# Patient Record
Sex: FEMALE | Race: WHITE
[De-identification: names, ages, dates, MRNs, and addresses within clinical notes are randomized per-mention and may not be internally consistent; named-entity substitution may affect disease eponyms.]

---

## 2017-02-22 ENCOUNTER — HOSPITAL ENCOUNTER (EMERGENCY)
Dept: HOSPITAL 57 - BURERS | Age: 57
Discharge: HOME | End: 2017-02-22
Payer: COMMERCIAL

## 2017-02-22 DIAGNOSIS — S52.502A: Primary | ICD-10-CM
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
## 2017-02-22 PROCEDURE — 96372 THER/PROPH/DIAG INJ SC/IM: CPT

## 2017-02-22 PROCEDURE — 25605 CLTX DST RDL FX/EPHYS SEP W/: CPT

## 2017-02-22 NOTE — RAD
LEFT WRIST TWO VIEWS

2/22/17

 

Post reduction films show restoration of a more normal alignment of the distal radial fracture. Ther
e is now no significant angulation. A splint has been applied. 

 

IMPRESSION:  

Successful reduction of the distal radial fracture.

 

POS: HOME

## 2017-02-22 NOTE — RAD
LEFT WRIST THREE VIEWS

2/22/17

 

A comminuted impacted fracture of the distal radius is present with extension into the radiocarpal j
oint. I cannot definitely detect a fracture of the ulnar styloid. The carpals appear intact. There i
s dorsal angulation of the distal radial fragment. 

 

IMPRESSION:  

Colles' fracture of the distal radius.

 

POS: HOME

## 2019-10-14 ENCOUNTER — HOSPITAL ENCOUNTER (OUTPATIENT)
Dept: HOSPITAL 92 - BICMRI | Age: 59
Discharge: HOME | End: 2019-10-14
Attending: FAMILY MEDICINE
Payer: COMMERCIAL

## 2019-10-14 DIAGNOSIS — G44.1: Primary | ICD-10-CM

## 2019-10-14 LAB — ESTIMATED GFR-MDRD - POC: 57

## 2019-10-14 PROCEDURE — 70553 MRI BRAIN STEM W/O & W/DYE: CPT

## 2019-10-14 PROCEDURE — 82565 ASSAY OF CREATININE: CPT

## 2019-10-14 NOTE — MRI
Exam: Brain MRI with and without contrast



HISTORY: Headache



COMPARISON: None



FINDINGS:

Gradient echo sequence: No hemorrhage

Calvarium: Appropriate T1 marrow signal intensity

Midline brain parenchyma: Unremarkable

Cerebrum:No parenchymal mass, mass effect or midline shift. Brain volume is age-appropriate. Cortical
 gray-white matter differentiation is preserved. Minimal scattered T2 and FLAIR white matter

hyperintensities, likely representing chronic small vessel ischemic change.

Ventricles: No evidence of hydrocephalus.



Sinuses and mastoid air cells: Adequate aeration

Diffusion: Central arterial flow is maintained. Absent restricted diffusion.

Postcontrast images: No pathologic enhancement of the brain parenchyma.



IMPRESSION:



1. Unremarkable pre and postcontrast brain MRI.

2. Minimal scattered T2 and FLAIR white matter hyperintensities, likely due to chronic small vessel i
schemic change.

3. Absent restricted diffusion. No pathologic enhancement.



Reported By: Earnestine Quintana 

Electronically Signed:  10/14/2019 10:08 AM

## 2021-01-09 ENCOUNTER — HOSPITAL ENCOUNTER (INPATIENT)
Dept: HOSPITAL 92 - ERS | Age: 61
LOS: 3 days | Discharge: HOME | DRG: 340 | End: 2021-01-12
Attending: SURGERY | Admitting: SURGERY
Payer: COMMERCIAL

## 2021-01-09 ENCOUNTER — HOSPITAL ENCOUNTER (EMERGENCY)
Dept: HOSPITAL 57 - BURERS | Age: 61
Discharge: ANOTHER HOSPITAL | End: 2021-01-09
Payer: COMMERCIAL

## 2021-01-09 VITALS — BODY MASS INDEX: 31.2 KG/M2

## 2021-01-09 DIAGNOSIS — Z88.8: ICD-10-CM

## 2021-01-09 DIAGNOSIS — Z79.899: ICD-10-CM

## 2021-01-09 DIAGNOSIS — K35.33: Primary | ICD-10-CM

## 2021-01-09 DIAGNOSIS — I10: ICD-10-CM

## 2021-01-09 DIAGNOSIS — G43.909: ICD-10-CM

## 2021-01-09 DIAGNOSIS — G89.29: ICD-10-CM

## 2021-01-09 DIAGNOSIS — Z98.1: ICD-10-CM

## 2021-01-09 DIAGNOSIS — Z88.1: ICD-10-CM

## 2021-01-09 DIAGNOSIS — Z88.2: ICD-10-CM

## 2021-01-09 DIAGNOSIS — M41.9: ICD-10-CM

## 2021-01-09 DIAGNOSIS — I34.1: ICD-10-CM

## 2021-01-09 DIAGNOSIS — Z20.822: ICD-10-CM

## 2021-01-09 LAB
ALBUMIN SERPL BCG-MCNC: 4 G/DL (ref 3.4–4.8)
ALP SERPL-CCNC: 70 U/L (ref 40–110)
ALT SERPL W P-5'-P-CCNC: 31 U/L (ref 8–55)
AMYLASE SERPL-CCNC: 75 U/L (ref 25–125)
ANION GAP SERPL CALC-SCNC: 17 MMOL/L (ref 10–20)
APTT PPP: 27.8 SEC (ref 22.9–36.1)
AST SERPL-CCNC: 22 U/L (ref 5–34)
BASOPHILS # BLD AUTO: 0 THOU/UL (ref 0–0.2)
BASOPHILS NFR BLD AUTO: 0.9 % (ref 0–1)
BILIRUB SERPL-MCNC: 0.7 MG/DL (ref 0.2–1.2)
BUN SERPL-MCNC: 11 MG/DL (ref 9.8–20.1)
CALCIUM SERPL-MCNC: 8.6 MG/DL (ref 7.8–10.44)
CHLORIDE SERPL-SCNC: 100 MMOL/L (ref 98–107)
CO2 SERPL-SCNC: 23 MMOL/L (ref 23–31)
CREAT CL PREDICTED SERPL C-G-VRATE: 0 ML/MIN (ref 70–130)
EOSINOPHIL # BLD AUTO: 0 THOU/UL (ref 0–0.7)
EOSINOPHIL NFR BLD AUTO: 0 % (ref 0–10)
GLOBULIN SER CALC-MCNC: 2.6 G/DL (ref 2.4–3.5)
GLUCOSE SERPL-MCNC: 175 MG/DL (ref 80–115)
HGB BLD-MCNC: 15.1 G/DL (ref 12–16)
INR PPP: 1.1
LYMPHOCYTES # BLD AUTO: 0.5 THOU/UL (ref 1.2–3.4)
LYMPHOCYTES NFR BLD AUTO: 13.7 % (ref 21–51)
MCH RBC QN AUTO: 31.1 PG (ref 27–31)
MCV RBC AUTO: 95.2 FL (ref 78–98)
MONOCYTES # BLD AUTO: 0.1 THOU/UL (ref 0.11–0.59)
MONOCYTES NFR BLD AUTO: 4.2 % (ref 0–10)
NEUTROPHILS # BLD AUTO: 2.8 THOU/UL (ref 1.4–6.5)
NEUTROPHILS NFR BLD AUTO: 81.1 % (ref 42–75)
PLATELET # BLD AUTO: 152 THOU/UL (ref 130–400)
POTASSIUM SERPL-SCNC: 4.1 MMOL/L (ref 3.5–5.1)
PROTHROMBIN TIME: 14.5 SEC (ref 12–14.7)
RBC # BLD AUTO: 4.87 MILL/UL (ref 4.2–5.4)
SODIUM SERPL-SCNC: 136 MMOL/L (ref 136–145)
SP GR UR STRIP: 1.01 (ref 1–1.03)
WBC # BLD AUTO: 3.4 THOU/UL (ref 4.8–10.8)

## 2021-01-09 PROCEDURE — 80048 BASIC METABOLIC PNL TOTAL CA: CPT

## 2021-01-09 PROCEDURE — 83690 ASSAY OF LIPASE: CPT

## 2021-01-09 PROCEDURE — S0020 INJECTION, BUPIVICAINE HYDRO: HCPCS

## 2021-01-09 PROCEDURE — 93005 ELECTROCARDIOGRAM TRACING: CPT

## 2021-01-09 PROCEDURE — 0240U: CPT

## 2021-01-09 PROCEDURE — 81003 URINALYSIS AUTO W/O SCOPE: CPT

## 2021-01-09 PROCEDURE — 82150 ASSAY OF AMYLASE: CPT

## 2021-01-09 PROCEDURE — 74177 CT ABD & PELVIS W/CONTRAST: CPT

## 2021-01-09 PROCEDURE — 85025 COMPLETE CBC W/AUTO DIFF WBC: CPT

## 2021-01-09 PROCEDURE — 85610 PROTHROMBIN TIME: CPT

## 2021-01-09 PROCEDURE — 0DTJ4ZZ RESECTION OF APPENDIX, PERCUTANEOUS ENDOSCOPIC APPROACH: ICD-10-PCS | Performed by: SURGERY

## 2021-01-09 PROCEDURE — 86901 BLOOD TYPING SEROLOGIC RH(D): CPT

## 2021-01-09 PROCEDURE — 71045 X-RAY EXAM CHEST 1 VIEW: CPT

## 2021-01-09 PROCEDURE — 86850 RBC ANTIBODY SCREEN: CPT

## 2021-01-09 PROCEDURE — 86900 BLOOD TYPING SEROLOGIC ABO: CPT

## 2021-01-09 PROCEDURE — S0028 INJECTION, FAMOTIDINE, 20 MG: HCPCS

## 2021-01-09 PROCEDURE — 96374 THER/PROPH/DIAG INJ IV PUSH: CPT

## 2021-01-09 PROCEDURE — 83735 ASSAY OF MAGNESIUM: CPT

## 2021-01-09 PROCEDURE — 88304 TISSUE EXAM BY PATHOLOGIST: CPT

## 2021-01-09 PROCEDURE — 0W9J4ZZ DRAINAGE OF PELVIC CAVITY, PERCUTANEOUS ENDOSCOPIC APPROACH: ICD-10-PCS | Performed by: SURGERY

## 2021-01-09 PROCEDURE — 36415 COLL VENOUS BLD VENIPUNCTURE: CPT

## 2021-01-09 PROCEDURE — 85730 THROMBOPLASTIN TIME PARTIAL: CPT

## 2021-01-09 PROCEDURE — 80053 COMPREHEN METABOLIC PANEL: CPT

## 2021-01-09 PROCEDURE — 84100 ASSAY OF PHOSPHORUS: CPT

## 2021-01-09 RX ADMIN — METRONIDAZOLE SCH MLS: 500 INJECTION, SOLUTION INTRAVENOUS at 23:24

## 2021-01-09 RX ADMIN — FAMOTIDINE SCH: 10 INJECTION, SOLUTION INTRAVENOUS at 20:35

## 2021-01-09 NOTE — CT
CT ABDOMEN AND PELVIS WITH CONTRAST:

 

Date:  01/09/2021

 

Spiral CT of the abdomen and pelvis was done for evaluation of abdominal pain. 

 

The major finding on this study is a markedly dilated appendix, up to 2.0 cm in width. Multiple appen
dicoliths are seen within it, one large one being at the base as it joins the cecum. The appendiceal 
walls enhance with IV contrast. There is periappendiceal stranding and some free fluid in the vicinit
y. All of these findings suggest acute appendicitis, presumably with rupture. Some pockets of free fl
uid collect down in the cul-de-sac, as well as around the region of the appendix itself. The appendix
 is rather long and descends rather deeply down into the pelvis and is nearly at one side of the cul-
de-sac. No free air is seen. 

 

The lung bases are clear, except for some atelectasis. The liver, spleen, pancreas, gallbladder, adre
nal glands, and abdominal aorta showed no acute findings. There is virtually no arteriosclerotic mendiola
ge. The right kidney appears normal. The left kidney contains a 1.6 cm rounded lucency that is most l
ikely a cyst. Its CT numbers are slightly high at 16. An elective ultrasound to prove that it is a cy
st would be prudent at some point in the future. 

 

Aside from the appendiceal findings, the only other GI tract finding of note is a small hiatal hernia
. 

 

CT of the pelvis was remarkable for the appendiceal, inflammatory, and fluid findings. No other areas
 of concern were seen. Extensive degenerative changes are present in the patient's spine and there ha
ve been prior laminectomies in the lower lumbar region. 

 

IMPRESSION: 

Findings consistent with acute appendicitis, presumably with rupture, given free fluid seen in the vi
cinity. See full description above. 

 

Findings discussed with Dr. Fajardo at 0816 hours on 01/09/2021. 

CODE CR. 

 

 

POS: HOME

## 2021-01-09 NOTE — OP
DATE OF PROCEDURE:  01/09/2021



PREOPERATIVE DIAGNOSIS:  Acute appendicitis with perforation.



POSTOPERATIVE DIAGNOSES:  

1. Acute appendicitis with perforation.

2. Peritoneal abscesses.



OPERATIONS PERFORMED:  

1. Laparoscopic appendectomy.

2. Drainage of peritoneal abscesses.



ANESTHESIA:  General endotracheal.



ESTIMATED BLOOD LOSS:  20 mL.



FLUIDS GIVEN:  1000 mL crystalloids.



URINARY OUTPUT:  600 mL.



COUNTS:  Sponge and instrument counts were verified as correct x2.



COMPLICATIONS:  None apparent at the time of operation.



INDICATIONS FOR OPERATION:  A 60-year-old woman, presented with 2-day history of

what started as periumbilical abdominal pain, which become generalized by today. 



Clinical and radiographic examination were consistent with acute appendicitis with

perforation for which the patient was brought to the operating room for

appendectomy. 



Findings are consistent with suppurative, perforated acute appendicitis with

peritoneal abscesses. 



DESCRIPTION OF OPERATION:  Informed consent was obtained from the patient, was

brought to the operating room and placed in supine position. 



Following general anesthesia, Khoury catheter was inserted and placed to bedside

drain. 



The abdomen was sterilely prepped and draped in usual fashion. 



Skin below the umbilicus was infiltrated with 0.25% Marcaine with epinephrine. 



A small curvilinear infraumbilical incision was made using an 11 scalpel. 



Umbilical stalk was grasped with Kocher and elevated. 



Veress needle was inserted through the incision and placed in the peritoneal cavity,

through which the abdomen was insufflated with 3 L of CO2 gas. 



Intraabdominal pressure noted at 1 mmHg. 



Following abdominal insufflation, the Veress needle was removed and a 5 mm trocar

introduced using a Visiport under laparoscopy. 



Laparoscopy confirmed proper placement of the port.  No injuries to underlying

structures. 



Additional laparoscopy reveals extensive amount of intraperitoneal fibrinous

exudates with cloudy ascitic fluid. 



Significant omental adhesions obscured the right lower quadrant. 



Under direct laparoscopy, a 5 mm left lower quadrant port was placed after the

overlying skin was infiltrated with 0.25% Marcaine with epinephrine.  Appropriate

incision was made. 



I then introduced the Endo suction catheter with cautery, using this to bluntly take

down omental adhesions off the anterior abdominal wall, exposing a large amount of

purulent fluid in the deep pelvis which was suctioned out. 



At that juncture, a 5 mm suprapubic port was placed under direct vision after the

overlying skin was infiltrated with 0.25% Marcaine with epinephrine. 



The patient was then placed in a Trendelenburg position and rotated to her left. 



I introduced a Prestige grasper through the left lower quadrant port site, using

this to bluntly tease apart small bowel and omental adhesions.  I was then able to

trace the distal ileum to the appendix. 



I did not find any Meckel diverticulum. 



An intrapelvic suppurative appendix was identified, which was perforated at the

base. 



I then introduced the Endo [place] forceps through the suprapubic port site grasping

the appendix, which was elevated. 



Mesoappendix was then serially divided down to the base using a LigaSure device with

good hemostasis. 



The appendix itself was divided at the appendicocecal junction between Endoloop and

then delivered off the abdominal cavity using an EndoCatch. 



Operative site was copiously irrigated with saline until it was clear. 



A #19 Dani drain was introduced into the pelvic cavity and allowing this to exit

through the left lower quadrant port site. 



The catheter was secured to the anterior abdominal wall using 2-0 silk suture. 



All ports and instruments were removed and accounted for after the abdomen was

desufflated. 



Skin incisions were closed using 4-0 Monocryl suture in subcuticular fashion. 



Dermabond was applied over incisional closure. 



The patient tolerated this operation without any apparent complication and was

returned to recovery room in satisfactory condition. 







Job ID:  [id number]

## 2021-01-09 NOTE — RAD
RADIOGRAPH CHEST 1 VIEW:



DATE:

1/9/2021



HISTORY:

60-year-old female for preoperative clearance.



FINDINGS:

Shallow inspiration. Mild patchy densities at base of left lower lobe. On CT of abdomen, this was milagros
wn to represent a combination of paracardial fat pad and subsegmental atelectasis. There is no

consolidation, pulmonary edema, or pneumothorax. The lateral costophrenic angles are not effaced.



IMPRESSION:

No consolidation



Reported By: aJson Quinones 

Electronically Signed:  1/9/2021 9:50 AM

## 2021-01-10 LAB
ANION GAP SERPL CALC-SCNC: 14 MMOL/L (ref 10–20)
BUN SERPL-MCNC: 14 MG/DL (ref 9.8–20.1)
CALCIUM SERPL-MCNC: 7.9 MG/DL (ref 7.8–10.44)
CHLORIDE SERPL-SCNC: 105 MMOL/L (ref 98–107)
CO2 SERPL-SCNC: 21 MMOL/L (ref 23–31)
CREAT CL PREDICTED SERPL C-G-VRATE: 86 ML/MIN (ref 70–130)
GLUCOSE SERPL-MCNC: 143 MG/DL (ref 80–115)
HGB BLD-MCNC: 12.4 G/DL (ref 12–16)
MAGNESIUM SERPL-MCNC: 1.8 MG/DL (ref 1.6–2.6)
MCH RBC QN AUTO: 31.9 PG (ref 27–31)
MCV RBC AUTO: 96.6 FL (ref 78–98)
MDIFF COMPLETE?: YES
PLATELET # BLD AUTO: 131 THOU/UL (ref 130–400)
POTASSIUM SERPL-SCNC: 4.1 MMOL/L (ref 3.5–5.1)
RBC # BLD AUTO: 3.87 MILL/UL (ref 4.2–5.4)
SODIUM SERPL-SCNC: 136 MMOL/L (ref 136–145)
WBC # BLD AUTO: 5.5 THOU/UL (ref 4.8–10.8)

## 2021-01-10 RX ADMIN — METRONIDAZOLE SCH MLS: 500 INJECTION, SOLUTION INTRAVENOUS at 23:09

## 2021-01-10 RX ADMIN — FAMOTIDINE SCH MG: 10 INJECTION, SOLUTION INTRAVENOUS at 08:58

## 2021-01-10 RX ADMIN — FAMOTIDINE SCH: 10 INJECTION, SOLUTION INTRAVENOUS at 20:31

## 2021-01-10 RX ADMIN — METRONIDAZOLE SCH MLS: 500 INJECTION, SOLUTION INTRAVENOUS at 11:48

## 2021-01-10 RX ADMIN — METRONIDAZOLE SCH MLS: 500 INJECTION, SOLUTION INTRAVENOUS at 05:13

## 2021-01-10 RX ADMIN — METRONIDAZOLE SCH MLS: 500 INJECTION, SOLUTION INTRAVENOUS at 17:51

## 2021-01-10 NOTE — PRG
DATE OF SERVICE:  01/10/2021



SUBJECTIVE:  Ms. Pitts is a 60-year-old woman who is postoperative day #1, status

post laparoscopic appendectomy and drainage of peritoneal abscesses. 



The patient is awake and alert this morning, reporting adequate pain control. 



She is passing some flatus, having good urinary output. 



She denies any nausea or vomiting.



OBJECTIVE:  VITAL SIGNS:  This morning include blood pressure 111/65, pulse 82,

respiratory rate is 16, temperature 97.7 degrees Fahrenheit, and oxygen saturation

is 95% on room air. 

HEART:  Regular rate and rhythm. 

LUNGS:  Clear to auscultation bilaterally.  Breathing, regular and unlabored. 

ABDOMEN:  Soft and nondistended.  Incisions are intact, clean, and dry.

Tirso-Ramon drain has returned 160 mL of serosanguineous fluid over the last 24

hours.  She has no peritoneal signs on examination. 



LABORATORY FINDINGS:  Today include a CBC with 5500 white blood cells, hemoglobin

and hematocrit stable at 12.4 and 37.4 respectively, and platelet count is 131,000. 



Metabolic profile:  Sodium 136, potassium 4.1, chloride is 105, bicarb is 21, BUN

14, creatinine 0.87, and glucose 143.  Magnesium 1.8.  Phosphorus is 2.4. 



IMPRESSION:  Postoperative day #1, status post laparoscopic appendectomy and

drainage of peritoneal abscesses. 



PLAN:  

1. The patient will be started on a clear liquid diet.

2. We will continue intravenous antibiotic therapy, which will be converted to p.o.

tomorrow if tolerating oral intake. 

3. Increase activity per Physical and Occupational Therapy.

4. The patient indicates understanding information provided.  I have answered her

questions. 

5. Anticipate discharge to home over the next 24 to 48 hours if she remains stable.







Job ID:  [id number]

## 2021-01-11 RX ADMIN — METRONIDAZOLE SCH MLS: 500 INJECTION, SOLUTION INTRAVENOUS at 05:36

## 2021-01-11 NOTE — PRG
DATE OF SERVICE:  01/11/2021



SUBJECTIVE:  The patient is a 60-year-old female, postop day two, status

post laparoscopic appendectomy and drainage of peritoneal abscesses.  The 
patient

states that her pain is well controlled on Tylenol and Motrin alone.  The 
patient is

having some flatus and adequate urinary output.  Tolerated diet well, no nausea 
and

vomiting. 



OBJECTIVE:  VITAL SIGNS:  Blood pressure 129/82, pulse 80, respirations 16,

temperature 97.6, O2 saturation 96% on room air. 

HEART:  Regular rate and rhythm. 

LUNGS:  Breathing regular and nonlabored. 

ABDOMEN:  Soft, nondistended.  Incisions are intact, clean and dry.  ESTELA drain 
had

serosanguineous fluid. 



LABORATORY FINDINGS:  No new labs overnight.



ASSESSMENT:  Postoperative day two, status post laparoscopic appendectomy and

drainage of peritoneal abscesses. 



PLAN:  The patient's antibiotics will be switched over from IV to p.o.  We will

increase diet from full liquids to regular diet.  Continue supportive treatment.

Pain controlled on Tylenol and ibuprofen.  The patient will likely be sent home 
on

this regimen.  The patient likely to be discharged either today or tomorrow. 







Job ID:  [id number]



MTDD

## 2021-01-12 VITALS — TEMPERATURE: 98 F | DIASTOLIC BLOOD PRESSURE: 76 MMHG | SYSTOLIC BLOOD PRESSURE: 128 MMHG

## 2021-02-24 ENCOUNTER — HOSPITAL ENCOUNTER (OUTPATIENT)
Dept: HOSPITAL 92 - CT | Age: 61
Discharge: HOME | End: 2021-02-24
Attending: SURGERY
Payer: COMMERCIAL

## 2021-02-24 DIAGNOSIS — K35.33: Primary | ICD-10-CM

## 2021-02-24 DIAGNOSIS — K76.9: ICD-10-CM

## 2021-02-24 DIAGNOSIS — N28.1: ICD-10-CM

## 2021-02-24 DIAGNOSIS — K57.30: ICD-10-CM

## 2021-02-24 LAB — ESTIMATED GFR-MDRD - POC: 56

## 2021-02-24 PROCEDURE — 82565 ASSAY OF CREATININE: CPT

## 2021-02-24 PROCEDURE — 74177 CT ABD & PELVIS W/CONTRAST: CPT

## 2021-02-24 NOTE — CT
CT ABDOMEN AND PELVIS WITH IV CONTRAST

 2/24/2021



CLINICAL INFORMATION:

History of prior appendectomy secondary to ruptured appendix.



COMPARISON:

 1/9/2021



Technique:

Multiple contiguous axial CT images are obtained through the abdomen and pelvis with IV contrast. Cor
onal reformatted images are provided.



FINDINGS:



Lower Chest: Minimal dependent atelectasis at each lung base.

Vessels: Mild vascular calcifications in the abdominal aorta. 



Abdomen:

Portal vein:Patent

Gallbladder: Within normal limits for CT imaging.

Liver: There is a subcentimeter low-density focus seen in the posteromedial aspect of the medial segm
ent right hepatic lobe with an additional more subtle hypodense lesion closely adjacent to this

lesion. Subcentimeter hypodense lesion is seen in the posterior segment right hepatic lobe. These les
ions were also present on prior exam but better delineated on today's examination. These hypodense

lesions are too small to further characterize. Continued follow-up is recommended.

Spleen: within normal limits.

Pancreas: within normal limits.

Adrenals: within normal limits.

Kidneys: Stable superior pole left renal cyst is noted. Kidneys otherwise have a normal CT appearance
 bilaterally.



Bowel: Scattered colonic diverticula are present. Loops of small bowel are normal in caliber.

Appendix: Not visualized compatible with patient's history of prior appendectomy. There is minimal st
randing adjacent to the cecal apex which may represent postoperative change and/or areas of

scarring.



Peritoneum: No free intraperitoneal gas or fluid is seen. There is no fluid collection seen to sugges
t an abscess.

Mesentery and Retroperitoneum: No enlarged mesenteric or retroperitoneal lymph nodes.

Abdominal Wall: within normal limits.



Pelvis:

Reproductive Organs: No pelvic masses.

Bladder: within normal limits.



Bones: Degenerative changes are again seen in the spine.  



IMPRESSION:



1. Subcentimeter low-density lesions within the liver are difficult to further characterize due to ve
ry small size. Follow-up evaluation in 6 months is recommended.

2. Left renal cysts.

3. Colonic diverticulosis.

4. Nonvisualization of the appendix compatible with patient's surgical history of appendectomy. Minim
al stranding is seen adjacent to the cecal apex which may represent postoperative changes and mild

scarring. No fluid collection is seen to suggest an abscess.



Reported By: Jaime Ashby 

Electronically Signed:  2/24/2021 8:56 AM

## 2021-08-09 NOTE — DIS
DATE OF ADMISSION:  01/09/2021



DATE OF DISCHARGE:  01/12/2021



DISCHARGE PHYSICIAN:  Mani Washington DO



ADMITTING DIAGNOSIS:  Acute appendicitis, ruptured.



DISCHARGE DIAGNOSIS:  Acute appendicitis, ruptured plus peritoneal abscesses.



PROCEDURE PERFORMED:  Laparoscopic appendectomy with drainage of peritoneal

abscesses on 01/09/2021 by Dr. Washington.  Please see separate dictation for operative

report. 



HISTORY AND HOSPITAL COURSE:  A 60-year-old woman presented with 2- to 3-day history

of abdominal pain. 



Clinical and radiographic examination were consistent with acute appendicitis with

perforation, for which the patient was taken to the operating room for laparoscopic

appendectomy. 



Findings in surgery included suppurative appendicitis with rupture and peritoneal

abscesses which required drainage. 



Following surgery, the patient was admitted to surgical floor, where she remained at

time of discharge. 



Postop day #3 today, the patient is ambulating with minimum difficulty. 



She is now tolerating general diet having normal bowel and urinary function. 



Pain is adequately controlled on oral analgesics.  She has been on oral antibiotics

since yesterday and has had no fevers or chills. 



Abdominal examination today reveals intact incisional wounds. 



The Tirso-Ramon drain was noted with 30 mL of serous fluid over the previous 24

hours and the drain was removed without incident. 



Clearly, the patient had no peritoneal signs on examination today. 



She has maximized hospital benefit and was discharged home today with the following

instructions: 

1. She follows up with me in the Surgery [facility] in 2 weeks.

2. She may take Tylenol 1000 mg p.o. q.6 hours alternating this with ibuprofen 600

mg p.o. q.8 hours p.r.n. pain. 

3. She is also given a prescription today for metronidazole 500 mg p.o. t.i.d. and

levofloxacin 750 mg p.o. daily, both for 10 days.  She is also to take Florastor 250

mg p.o. daily.  She is given prescription for 30 capsules.  The patient is

instructed to avoid weight lifting in excess of 20 pounds until she has been

released by me. 

4. She may shower and avoid swimming or taking baths until she has been released by

me. 

5. She is to call me with any questions or problems including fever in excess of 101

degrees Fahrenheit, intolerance to oral intake, exacerbation of abdominal pain, or

any abnormal drainage from incisional wounds. 

6. The patient indicates understanding information provided to her today.

7. I have answered her questions.  The patient has expressed gratitude for the care

rendered to her during this hospitalization and surgery. 







Job ID:  [id number]
[facility]

## 2024-06-07 ENCOUNTER — HOSPITAL ENCOUNTER (OUTPATIENT)
Dept: HOSPITAL 92 - BICRAD | Age: 64
Discharge: HOME | End: 2024-06-07
Attending: FAMILY MEDICINE
Payer: COMMERCIAL

## 2024-06-07 DIAGNOSIS — R06.02: Primary | ICD-10-CM

## 2024-06-07 PROCEDURE — 71046 X-RAY EXAM CHEST 2 VIEWS: CPT

## 2024-11-18 ENCOUNTER — HOSPITAL ENCOUNTER (OUTPATIENT)
Dept: HOSPITAL 92 - CSHSLEEP | Age: 64
Discharge: HOME | End: 2024-11-18
Attending: INTERNAL MEDICINE
Payer: MEDICARE

## 2024-11-18 DIAGNOSIS — G47.33: Primary | ICD-10-CM

## 2024-11-18 PROCEDURE — 95801 SLP STDY UNATND W/ANAL: CPT
